# Patient Record
Sex: FEMALE | Race: WHITE
[De-identification: names, ages, dates, MRNs, and addresses within clinical notes are randomized per-mention and may not be internally consistent; named-entity substitution may affect disease eponyms.]

---

## 2017-08-28 ENCOUNTER — HOSPITAL ENCOUNTER (EMERGENCY)
Dept: HOSPITAL 14 - H.ER | Age: 57
Discharge: HOME | End: 2017-08-28
Payer: SELF-PAY

## 2017-08-28 VITALS
RESPIRATION RATE: 16 BRPM | SYSTOLIC BLOOD PRESSURE: 123 MMHG | TEMPERATURE: 98.8 F | DIASTOLIC BLOOD PRESSURE: 81 MMHG | HEART RATE: 90 BPM

## 2017-08-28 VITALS — OXYGEN SATURATION: 99 %

## 2017-08-28 DIAGNOSIS — F41.9: Primary | ICD-10-CM

## 2017-08-28 NOTE — ED PDOC
HPI: General Adult


Time Seen by Provider: 17 15:55


Chief Complaint (Nursing): Anxiety


History Per: Patient, Family (daughter)


Additional Complaint(s): 





Pt.'s daughter states they've been having a velasquez problem at home and she 

sprayed her entire kitchen with Raid Velasquez Killer. Shortly prior to arrival pt. 

went into fridge and had a piece of watermelon and tasted a chemical taste and 

she immediately spit out the piece. She then developed a feeling of anxiety, 

abdominal pain, and rapid breathing. Daughter states that a few months ago pt.'

s grandson  and since then pt's has been having a difficult time dealing 

with stress. Denies SI/HI, hallucinations, chest pain, SOB, palpitations. 





Past Medical History


Reviewed: Historical Data, Nursing Documentation, Vital Signs


Vital Signs: 


 Last Vital Signs











Temp  98.8 F   17 21:02


 


Pulse  90   17 21:02


 


Resp  16   17 21:02


 


BP  123/81   17 21:02


 


Pulse Ox  99   17 21:02














- Family History


Family History: States: No Known Family Hx





- Allergies


Allergies/Adverse Reactions: 


 Allergies











Allergy/AdvReac Type Severity Reaction Status Date / Time


 


iodine Allergy  ANAPHYLAXIS Verified 17 15:42


 


fruit Allergy  RASH Uncoded 17 15:42














Review of Systems


ROS Statement: Except As Marked, All Systems Reviewed And Found Negative


Psych: Positive for: Anxiety





Physical Exam





- Reviewed


Nursing Documentation Reviewed: Yes


Vital Signs Reviewed: Yes





- Physical Exam


Appears: Positive for: Well, Non-toxic, No Acute Distress


Head Exam: Positive for: ATRAUMATIC, NORMAL INSPECTION, NORMOCEPHALIC


Skin: Positive for: Normal Color, Warm.  Negative for: Rash


Eye Exam: Positive for: EOMI, Normal appearance, PERRL


ENT: Positive for: Normal ENT Inspection


Neck: Positive for: Normal, Painless ROM


Cardiovascular/Chest: Positive for: Regular Rate, Rhythm


Respiratory: Positive for: CNT, Normal Breath Sounds


Gastrointestinal/Abdominal: Positive for: Normal Exam, Bowel Sounds, Soft.  

Negative for: Tenderness


Back: Positive for: Normal Inspection


Extremity: Positive for: Normal ROM


Neurologic/Psych: Positive for: Alert, Oriented, Mood/Affect (appears anxious).

  Negative for: Aphasia, Facial Droop





- ECG


O2 Sat by Pulse Oximetry: 99





- Progress


ED Course And Treament: 





Ativan 1mg PO ordered.


Crisis evaluation ordered.








Disposition





- Clinical Impression


Clinical Impression: 


 Anxiety








- Patient ED Disposition


Is Patient to be Admitted: Transfer of Care (Signed out to Ricky TRACY pending 

crisis disposition.)





- Disposition


Disposition Time: 20:00


Condition: STABLE


Instructions:  Anxiety (ED)


Print Language: Haitian

## 2017-08-28 NOTE — ED PDOC
- ECG


O2 Sat by Pulse Oximetry: 99





- Progress


ED Course And Treament: 





Case endorsed to writer from Vivian TRACY pending crisis eval





Patient evaluated by crisis worker; does not meet criteria for admission at 

this time as per Dr. Pulido.


Follow up outpatient therapy.


Return to ED for worsening/concerning symptoms.





Disposition





- Clinical Impression


Clinical Impression: 


 Anxiety








- POA


Present On Arrival: None





- Disposition


Disposition: Routine/Home


Disposition Time: 20:56


Condition: STABLE


Instructions:  Anxiety (ED)


Print Language: Syriac

## 2017-08-28 NOTE — ED PDOC
- ECG


O2 Sat by Pulse Oximetry: 99





Disposition





- Disposition


Forms:  [facility] (English)

## 2018-01-13 ENCOUNTER — HOSPITAL ENCOUNTER (EMERGENCY)
Dept: HOSPITAL 14 - H.ER | Age: 58
Discharge: HOME | End: 2018-01-13
Payer: COMMERCIAL

## 2018-01-13 VITALS
DIASTOLIC BLOOD PRESSURE: 81 MMHG | TEMPERATURE: 97.8 F | SYSTOLIC BLOOD PRESSURE: 129 MMHG | HEART RATE: 92 BPM | RESPIRATION RATE: 20 BRPM | OXYGEN SATURATION: 97 %

## 2018-01-13 DIAGNOSIS — N39.0: Primary | ICD-10-CM

## 2018-01-13 NOTE — ED PDOC
HPI: Female  Pain


Time Seen by Provider: 18 12:03


Chief Complaint (Nursing): Female Genitourinary


Chief Complaint (Provider): Dysuria and Frequency 


History Per: Patient


History/Exam Limitations: no limitations


Onset/Duration Of Symptoms: Days (x1)


Current Symptoms Are (Timing): Still Present


Pain Scale Rating Of: 6


Associated Symptoms: denies: Fever, Vomiting, Back Pain (no flank pain)


Alleviating Factors: None


Additional Complaint(s): 


57 year old female presenting with dysuria and frequency x1 day. The patient 

reports that she took Azo today without relief. She further states that she had 

similar symptoms last month and was treated with oral antibiotics. Denies fever

, flank pain,  vomiting.





PMD: Non [facility] Provider,


Abnormal Vaginal Bleeding: No





Past Medical History


Reviewed: Historical Data, Nursing Documentation, Vital Signs


Vital Signs: 





 Last Vital Signs











Temp  97.8 F   18 11:51


 


Pulse  92 H  18 11:51


 


Resp  20   18 11:51


 


BP  129/81   18 11:51


 


Pulse Ox  97   18 11:51














- Medical History


PMH: Arthritis


   Denies: Diabetes, Hepatitis, HIV, HTN, Seizures, Sexually Transmitted Disease





- Surgical History


Surgical History: 





- Family History


Family History: States: Unknown Family Hx





- Living Arrangements


Living Arrangements: With Family





- Home Medications


Home Medications: 


 Ambulatory Orders











 Medication  Instructions  Recorded


 


Ciprofloxacin [Cipro] 500 mg PO BID #14 tab 18














- Allergies


Allergies/Adverse Reactions: 


 Allergies











Allergy/AdvReac Type Severity Reaction Status Date / Time


 


iodine Allergy  ANAPHYLAXIS Verified 17 15:42


 


fruit Allergy  RASH Uncoded 17 15:42














Review of Systems


ROS Statement: Except As Marked, All Systems Reviewed And Found Negative


Constitutional: Negative for: Fever


Gastrointestinal: Negative for: Vomiting


Genitourinary Female: Positive for: Dysuria, Frequency


Musculoskeletal: Negative for: Back Pain (no flank pain)





Physical Exam





- Reviewed


Nursing Documentation Reviewed: Yes


Vital Signs Reviewed: Yes





- Physical Exam


Appears: Positive for: Non-toxic, No Acute Distress


Head Exam: Positive for: NORMAL INSPECTION


Skin: Positive for: Normal Color, Warm, Dry.  Negative for: Rash


Eye Exam: Positive for: Normal appearance, EOMI, PERRL


Cardiovascular/Chest: Positive for: Regular Rate, Rhythm, Chest Non Tender.  

Negative for: Tachycardia


Respiratory: Positive for: Normal Breath Sounds.  Negative for: Respiratory 

Distress


Gastrointestinal/Abdominal: Positive for: Normal Exam, Bowel Sounds, Soft.  

Negative for: Tenderness, Guarding, Rebound


Back: Positive for: Normal Inspection.  Negative for: L CVA Tenderness, R CVA 

Tenderness


Neurologic/Psych: Positive for: Alert (A&Ox3), Oriented





- Laboratory Results


Urine dip results: Positive for: Leukocyte Esterase (large), Nitrate (positive)

, Glucose (100).  Negative for: Blood, Ketones, Bilirubin, Protein





- ECG


O2 Sat by Pulse Oximetry: 97 (RA)


Pulse Ox Interpretation: Normal





Medical Decision Making


Medical Decision Makin


Initial Impression


56 y/o female presenting with dysuria and frequency





Initial Plan:


* Udip


* Urine Culture


* Finger Stick


* Reevaluation





1223


Finger stick performed: 100


________________________________________________________________________________

__


Documented by Maribeth Seay acting as a scribe for Rhys Park PA-C. 





All medical record entries made by the Scribe were at my direction and 

personally dictated by me. I have reviewed the chart and agree that the record 

accurately reflects my personal performance of the history, physical exam, 

medical decision making, and the department course for this patient. I have 

also personally directed, reviewed, and agree with the discharge instructions 

and disposition.











Disposition





- Clinical Impression


Clinical Impression: 


 Urinary tract infection








- Patient ED Disposition


Is Patient to be Admitted: No





- Disposition


Referrals: 


[facility] [place] [Outside]


Disposition: Routine/Home


Disposition Time: 12:20


Condition: STABLE


Additional Instructions: 


Follow up with PMD for further evaluation. 


Prescriptions: 


Ciprofloxacin [Cipro] 500 mg PO BID #14 tab


Instructions:  Urinary Tract Infection in Women (ED)


Forms:  [facility] (Faroese)


Print Language: Arabic